# Patient Record
Sex: FEMALE | Race: WHITE | NOT HISPANIC OR LATINO
[De-identification: names, ages, dates, MRNs, and addresses within clinical notes are randomized per-mention and may not be internally consistent; named-entity substitution may affect disease eponyms.]

---

## 2024-04-01 PROBLEM — Z00.00 ENCOUNTER FOR PREVENTIVE HEALTH EXAMINATION: Status: ACTIVE | Noted: 2024-04-01

## 2024-04-08 ENCOUNTER — NON-APPOINTMENT (OUTPATIENT)
Age: 74
End: 2024-04-08

## 2024-04-12 ENCOUNTER — APPOINTMENT (OUTPATIENT)
Dept: BREAST CENTER | Facility: CLINIC | Age: 74
End: 2024-04-12
Payer: MEDICARE

## 2024-04-12 VITALS
HEART RATE: 100 BPM | DIASTOLIC BLOOD PRESSURE: 91 MMHG | HEIGHT: 67 IN | SYSTOLIC BLOOD PRESSURE: 160 MMHG | WEIGHT: 280 LBS | BODY MASS INDEX: 43.95 KG/M2

## 2024-04-12 DIAGNOSIS — Z80.3 FAMILY HISTORY OF MALIGNANT NEOPLASM OF BREAST: ICD-10-CM

## 2024-04-12 DIAGNOSIS — Z90.11 ACQUIRED ABSENCE OF RIGHT BREAST AND NIPPLE: ICD-10-CM

## 2024-04-12 DIAGNOSIS — M47.9 SPONDYLOSIS, UNSPECIFIED: ICD-10-CM

## 2024-04-12 DIAGNOSIS — Z98.890 OTHER SPECIFIED POSTPROCEDURAL STATES: ICD-10-CM

## 2024-04-12 PROCEDURE — 99204 OFFICE O/P NEW MOD 45 MIN: CPT

## 2024-04-12 RX ORDER — OMEPRAZOLE 20 MG/1
20 CAPSULE, DELAYED RELEASE ORAL DAILY
Refills: 0 | Status: ACTIVE | COMMUNITY
Start: 2024-04-12

## 2024-04-12 RX ORDER — PRAVASTATIN SODIUM 20 MG/1
20 TABLET ORAL DAILY
Refills: 0 | Status: ACTIVE | COMMUNITY
Start: 2024-04-12

## 2024-04-12 RX ORDER — METOPROLOL TARTRATE 100 MG/1
100 TABLET, FILM COATED ORAL DAILY
Refills: 0 | Status: ACTIVE | COMMUNITY
Start: 2024-04-12

## 2024-04-12 NOTE — HISTORY OF PRESENT ILLNESS
[FreeTextEntry1] : 74-year-old postmenopausal woman with a family history of breast cancer and a personal history of a right breast cancer presents after screening mammogram showed left breast asymmetry that on diagnostic views and ultrasound was found to be a 5 mm irregular centrally located mass measuring 5 mm, BI-RADS 4.  Left ultrasound-guided core biopsy revealed a moderately differentiated invasive ductal carcinoma that was ER/NE positive HER2 negative with a Ki-67 of 20%.  Patient denies any breast masses or bone pain.  In 1996 patient had a right breast mastectomy with TRAM flap reconstruction for a breast cancer followed by 3 years of tamoxifen without any chemotherapy or radiation.  Patient had a gene test then and again in 2005 both of which were negative.  Patient's mother had breast cancer at age 55 and a maternal uncle had prostate cancer.

## 2024-04-12 NOTE — PHYSICAL EXAM
[No Supraclavicular Adenopathy] : no supraclavicular adenopathy [No Cervical Adenopathy] : no cervical adenopathy [Clear to Auscultation Bilat] : clear to auscultation bilaterally [Asymmetrical] : asymmetrical [No dominant masses] : no dominant masses in right breast  [No dominant masses] : no dominant masses left breast [No Nipple Retraction] : no left nipple retraction [No Nipple Discharge] : no left nipple discharge [No Axillary Lymphadenopathy] : no left axillary lymphadenopathy [No Rashes] : no rashes [de-identified] : Status post right nonnipple sparing mastectomy with TRAM flap reconstruction.

## 2024-04-15 ENCOUNTER — RESULT REVIEW (OUTPATIENT)
Age: 74
End: 2024-04-15

## 2024-04-18 DIAGNOSIS — R59.0 LOCALIZED ENLARGED LYMPH NODES: ICD-10-CM

## 2024-04-21 ENCOUNTER — RESULT REVIEW (OUTPATIENT)
Age: 74
End: 2024-04-21

## 2024-04-23 ENCOUNTER — RESULT REVIEW (OUTPATIENT)
Age: 74
End: 2024-04-23

## 2024-04-29 ENCOUNTER — NON-APPOINTMENT (OUTPATIENT)
Age: 74
End: 2024-04-29

## 2024-05-07 ENCOUNTER — RESULT REVIEW (OUTPATIENT)
Age: 74
End: 2024-05-07

## 2024-05-10 ENCOUNTER — RESULT REVIEW (OUTPATIENT)
Age: 74
End: 2024-05-10

## 2024-05-10 ENCOUNTER — APPOINTMENT (OUTPATIENT)
Dept: BREAST CENTER | Facility: HOSPITAL | Age: 74
End: 2024-05-10

## 2024-05-15 ENCOUNTER — NON-APPOINTMENT (OUTPATIENT)
Age: 74
End: 2024-05-15

## 2024-05-21 ENCOUNTER — APPOINTMENT (OUTPATIENT)
Dept: BREAST CENTER | Facility: CLINIC | Age: 74
End: 2024-05-21
Payer: MEDICARE

## 2024-05-21 VITALS
DIASTOLIC BLOOD PRESSURE: 83 MMHG | HEIGHT: 67 IN | WEIGHT: 285 LBS | OXYGEN SATURATION: 93 % | BODY MASS INDEX: 44.73 KG/M2 | SYSTOLIC BLOOD PRESSURE: 142 MMHG | HEART RATE: 80 BPM

## 2024-05-21 DIAGNOSIS — Z85.3 PERSONAL HISTORY OF MALIGNANT NEOPLASM OF BREAST: ICD-10-CM

## 2024-05-21 PROCEDURE — 99024 POSTOP FOLLOW-UP VISIT: CPT

## 2024-05-21 NOTE — HISTORY OF PRESENT ILLNESS
[FreeTextEntry1] : 5/24 left partial mastectomy for a moderately differentiated invasive ductal carcinoma, 6 mm, negative margins, ER/NE positive HER2 negative with a Ki-67 of 15%.  Patient tolerated the surgery well, pain under control.  74-year-old postmenopausal woman with a family history of breast cancer and a personal history of a right breast cancer presents after screening mammogram showed left breast asymmetry that on diagnostic views and ultrasound was found to be a 5 mm irregular centrally located mass measuring 5 mm, BI-RADS 4.  Left ultrasound-guided core biopsy revealed a moderately differentiated invasive ductal carcinoma that was ER/NE positive HER2 negative with a Ki-67 of 20%.  Patient denies any breast masses or bone pain.  In 1996 patient had a right breast mastectomy with TRAM flap reconstruction for a breast cancer followed by 3 years of tamoxifen without any chemotherapy or radiation.  Patient had a gene test then and again in 2005 both of which were negative.  Patient's mother had breast cancer at age 55 and a maternal uncle had prostate cancer.

## 2024-06-13 ENCOUNTER — RESULT REVIEW (OUTPATIENT)
Age: 74
End: 2024-06-13

## 2024-06-13 ENCOUNTER — APPOINTMENT (OUTPATIENT)
Dept: HEMATOLOGY ONCOLOGY | Facility: CLINIC | Age: 74
End: 2024-06-13
Payer: MEDICARE

## 2024-06-13 VITALS
WEIGHT: 293 LBS | TEMPERATURE: 99.4 F | HEART RATE: 83 BPM | DIASTOLIC BLOOD PRESSURE: 87 MMHG | RESPIRATION RATE: 16 BRPM | BODY MASS INDEX: 45.99 KG/M2 | SYSTOLIC BLOOD PRESSURE: 140 MMHG | HEIGHT: 67 IN | OXYGEN SATURATION: 95 %

## 2024-06-13 DIAGNOSIS — Z78.9 OTHER SPECIFIED HEALTH STATUS: ICD-10-CM

## 2024-06-13 DIAGNOSIS — Z90.722 ACQUIRED ABSENCE OF BOTH CERVIX AND UTERUS: ICD-10-CM

## 2024-06-13 DIAGNOSIS — Z90.710 ACQUIRED ABSENCE OF BOTH CERVIX AND UTERUS: ICD-10-CM

## 2024-06-13 DIAGNOSIS — Z90.79 ACQUIRED ABSENCE OF BOTH CERVIX AND UTERUS: ICD-10-CM

## 2024-06-13 PROCEDURE — 99205 OFFICE O/P NEW HI 60 MIN: CPT

## 2024-06-13 RX ORDER — UBIDECARENONE/VIT E ACET 100MG-5
CAPSULE ORAL
Refills: 0 | Status: ACTIVE | COMMUNITY

## 2024-06-13 RX ORDER — ASCORBIC ACID 1000 MG
10 TABLET ORAL
Refills: 0 | Status: ACTIVE | COMMUNITY

## 2024-06-13 RX ORDER — AMLODIPINE BESYLATE 5 MG/1
TABLET ORAL
Refills: 0 | Status: ACTIVE | COMMUNITY

## 2024-06-13 NOTE — HISTORY OF PRESENT ILLNESS
[de-identified] : Ms. Hansen is a 74-year-old postmenopausal woman with a family history of breast cancer and a personal history of a right breast cancer presents for recurrence.   Oncological History:   In 1996 patient had a right breast mastectomy with TRAM flap reconstruction for a breast cancer followed by 3 years of tamoxifen without any chemotherapy or radiation.  s/p screening mammogram showed left breast asymmetry that on diagnostic views and ultrasound was found to be a 5 mm irregular centrally located mass measuring 5 mm, BI-RADS 4.   Left ultrasound-guided core biopsy revealed a moderately differentiated invasive ductal carcinoma that was ER/OH positive HER2 negative with a Ki-67 of 20%.   Patient had a gene test then and again in 2005 both of which were negative.   5/24 left partial mastectomy for a moderately differentiated invasive ductal carcinoma, 6 mm, negative margins, ER/OH positive HER2 negative with a Ki-67 of 15%.  DEXA many years ago. On ca++ vit D. Was told she had normal bone density.   Breast Cancer Risk Factors: Menarche: 14 Date of LMP: late 40s Menopause: late 40s G 3P2 Age at first live birth: 35 Nursed: yes Hysterectomy: yes Oophorectomy: yes OCP: 10-12 y HRT: no Last pap/pelvic exam: Dr. Morse  Related family history Patient's mother had breast cancer at age 55 and a maternal uncle had prostate cancer. Ashkenazi: yes  BRCA testing: negative s/p genetic counselor 2017

## 2024-06-13 NOTE — PHYSICAL EXAM
[Fully active, able to carry on all pre-disease performance without restriction] : Status 0 - Fully active, able to carry on all pre-disease performance without restriction [Normal] : affect appropriate [de-identified] : R mastectomy with

## 2024-06-13 NOTE — CONSULT LETTER
[Dear  ___] : Dear  [unfilled], [Consult Letter:] : I had the pleasure of evaluating your patient, [unfilled]. [Please see my note below.] : Please see my note below. [Consult Closing:] : Thank you very much for allowing me to participate in the care of this patient.  If you have any questions, please do not hesitate to contact me. [Sincerely,] : Sincerely, [FreeTextEntry3] : Cherise Reeder DO, FACIKE, FACP Medical Oncology and Hematology [facility]

## 2024-06-13 NOTE — ASSESSMENT
[FreeTextEntry1] : # L moderately differentiated invasive ductal carcinoma that was ER/SD positive HER2 negative with a Ki-67 of 20% s/p lumpectomy Reviewed the diagnosis, prognosis and natural history of IDC, ER+/SD+HER2- Reviewed the imaging and path Reviewed the NCCN guidelines and patient expresses understanding We have discussed that the patients tumor is >0.5 cm, node negative and hormone receptor positive thus she qualifies for Oncotype DX, a genomic test that will provide a recurrence score (RS). We discussed the role of oncotype in determining whether there would be a benefit of adding chemotherapy in addition to hormonal therapy based on her risk of recurrence. Since she is  >49 yo would only recommend the addition of chemotherapy if her recurrence score is > 25. She expressed understanding and she would like to proceed. We did discuss despite her recurrence score I would recommend an aromatase inhibitor, Anastrozole, 1mg PO q day for a minimum of 5 years given that her tumor is ER/SD+ and she is post menopausal. We have reviewed the side effects of Anastrozole to include but are not limited to hot flashes, mood swings, arthralgias, bone pain, thinning of the bones including osteopenia/osteoporosis. She will take this with Ca++ 1200 mg PO q day and Vit D 800 IU daily to protect her bone health. Will also check a baseline DEXA scan.  RTC in 2 weeks to review oncotype and dexa.

## 2024-06-14 LAB
25(OH)D3 SERPL-MCNC: 33.1 NG/ML
ALBUMIN SERPL ELPH-MCNC: 4.5 G/DL
ALP BLD-CCNC: 105 U/L
ALT SERPL-CCNC: 20 U/L
ANION GAP SERPL CALC-SCNC: 22 MMOL/L
AST SERPL-CCNC: 18 U/L
BILIRUB SERPL-MCNC: 0.6 MG/DL
BUN SERPL-MCNC: 17 MG/DL
CALCIUM SERPL-MCNC: 9.8 MG/DL
CHLORIDE SERPL-SCNC: 100 MMOL/L
CO2 SERPL-SCNC: 22 MMOL/L
CREAT SERPL-MCNC: 1.07 MG/DL
CRP SERPL-MCNC: 7 MG/L
EGFR: 55 ML/MIN/1.73M2
GLUCOSE SERPL-MCNC: 64 MG/DL
LDH SERPL-CCNC: 248 U/L
MAGNESIUM SERPL-MCNC: 1.5 MG/DL
PHOSPHATE SERPL-MCNC: 2.7 MG/DL
POTASSIUM SERPL-SCNC: 4 MMOL/L
PROT SERPL-MCNC: 7 G/DL
SODIUM SERPL-SCNC: 145 MMOL/L

## 2024-06-17 ENCOUNTER — RESULT REVIEW (OUTPATIENT)
Age: 74
End: 2024-06-17

## 2024-06-27 ENCOUNTER — APPOINTMENT (OUTPATIENT)
Dept: HEMATOLOGY ONCOLOGY | Facility: CLINIC | Age: 74
End: 2024-06-27
Payer: MEDICARE

## 2024-06-27 VITALS
DIASTOLIC BLOOD PRESSURE: 82 MMHG | WEIGHT: 293 LBS | BODY MASS INDEX: 45.99 KG/M2 | HEART RATE: 84 BPM | OXYGEN SATURATION: 96 % | TEMPERATURE: 99.3 F | SYSTOLIC BLOOD PRESSURE: 135 MMHG | RESPIRATION RATE: 16 BRPM | HEIGHT: 67 IN

## 2024-06-27 DIAGNOSIS — C50.812 MALIGNANT NEOPLASM OF OVERLAPPING SITES OF LEFT FEMALE BREAST: ICD-10-CM

## 2024-06-27 DIAGNOSIS — R11.0 NAUSEA: ICD-10-CM

## 2024-06-27 DIAGNOSIS — Z17.0 MALIGNANT NEOPLASM OF OVERLAPPING SITES OF LEFT FEMALE BREAST: ICD-10-CM

## 2024-06-27 PROCEDURE — 99215 OFFICE O/P EST HI 40 MIN: CPT

## 2024-06-27 NOTE — REASON FOR VISIT
[Initial Consultation] : an initial consultation [Follow-Up Visit] : a follow-up [FreeTextEntry2] : breast cancer

## 2024-06-27 NOTE — CONSULT LETTER
[Dear  ___] : Dear  [unfilled], [Consult Letter:] : I had the pleasure of evaluating your patient, [unfilled]. [Please see my note below.] : Please see my note below. [Consult Closing:] : Thank you very much for allowing me to participate in the care of this patient.  If you have any questions, please do not hesitate to contact me. [Sincerely,] : Sincerely, [FreeTextEntry3] : Cherise Reeder DO, FACIKE, FACP Medical Oncology and Hematology [facility]

## 2024-06-27 NOTE — HISTORY OF PRESENT ILLNESS
[de-identified] : Ms. Hansen is a 74-year-old postmenopausal woman with a family history of breast cancer and a personal history of a right breast cancer presents for recurrence.   Oncological History:   In 1996 patient had a right breast mastectomy with TRAM flap reconstruction for a breast cancer followed by 3 years of tamoxifen without any chemotherapy or radiation.  s/p screening mammogram showed left breast asymmetry that on diagnostic views and ultrasound was found to be a 5 mm irregular centrally located mass measuring 5 mm, BI-RADS 4.   Left ultrasound-guided core biopsy revealed a moderately differentiated invasive ductal carcinoma that was ER/LA positive HER2 negative with a Ki-67 of 20%.   Patient had a gene test then and again in 2005 both of which were negative.   5/24 left partial mastectomy for a moderately differentiated invasive ductal carcinoma, 6 mm, negative margins, ER/LA positive HER2 negative with a Ki-67 of 15%.  DEXA many years ago. On ca++ vit D. Was told she had normal bone density.   Breast Cancer Risk Factors: Menarche: 14 Date of LMP: late 40s Menopause: late 40s G 3P2 Age at first live birth: 35 Nursed: yes Hysterectomy: yes Oophorectomy: yes OCP: 10-12 y HRT: no Last pap/pelvic exam: Dr. Morse  Related family history Patient's mother had breast cancer at age 55 and a maternal uncle had prostate cancer. Ashkenazi: yes  BRCA testing: negative s/p genetic counselor 2017 [de-identified] : Patient is here for follow up and to review oncotype and bone density Feeling well  DEXA 6/18/2024 [facility]- [facility] GYN-2/2024 Mammo-5/2024

## 2024-06-27 NOTE — PHYSICAL EXAM
[Fully active, able to carry on all pre-disease performance without restriction] : Status 0 - Fully active, able to carry on all pre-disease performance without restriction [Normal] : affect appropriate [de-identified] : R mastectomy with

## 2024-06-27 NOTE — ASSESSMENT
[With Patient/Caregiver] : With Patient/Caregiver [Designated Health Care Proxy] : Designated Health Care Proxy [Name: ___] : Name: [unfilled] [Relationship: ___] : Relationship: [unfilled] [FreeTextEntry1] : # L moderately differentiated invasive ductal carcinoma that was ER/IA positive HER2 negative with a Ki-67 of 20% s/p lumpectomy oncotype 26 - >15% benefit for the addition of chemo I would recommend chemotherapy with Taxotere 75 mg/m2 and Cytoxan 600 mg/m2 followed by onpro x 4 cycles every 3 weeks.  We have reviewed the side effects of this regimen to include but are not limited to cytopenias with increased risk of infection, sepsis and death, N/V, fatigue, swelling, hair loss, <1 % chance of developing leukemia in the future.  Will give steroids day before, day of and day after treatment to aid with swelling Will get port for access. Lidocaine - prilocaine cream ordered Zofran orered PRN Nausea for home After chemo and RT I would recommend an aromatase inhibitor, Anastrozole, 1mg PO q day for a minimum of 5 years given that her tumor is ER/IA+ and she is post menopausal. We have reviewed the side effects of Anastrozole to include but are not limited to hot flashes, mood swings, arthralgias, bone pain, thinning of the bones including osteopenia/osteoporosis. She will take this with Ca++ 1200 mg PO q day and Vit D 800 IU daily to protect her bone health.  DEXA shows normal bone density Port ordered Emla cream ordered zofran ordered dexamethasone ordered. She does not wish to consider cold capping at this time.  RTC in 2 weeks with cbc with diff, cmp, ESR/CRP, LDH, Mag, phos, Vit D [AdvancecareDate] : 06/27/2024 [FreeTextEntry2] : Surrogate Principal Discharge DX:	Other closed displaced fracture of proximal end of right humerus, initial encounter

## 2024-07-09 ENCOUNTER — RESULT REVIEW (OUTPATIENT)
Age: 74
End: 2024-07-09

## 2024-07-10 ENCOUNTER — RESULT REVIEW (OUTPATIENT)
Age: 74
End: 2024-07-10

## 2024-07-11 ENCOUNTER — APPOINTMENT (OUTPATIENT)
Dept: HEMATOLOGY ONCOLOGY | Facility: CLINIC | Age: 74
End: 2024-07-11
Payer: MEDICARE

## 2024-07-11 ENCOUNTER — RESULT REVIEW (OUTPATIENT)
Age: 74
End: 2024-07-11

## 2024-07-11 ENCOUNTER — LABORATORY RESULT (OUTPATIENT)
Age: 74
End: 2024-07-11

## 2024-07-11 VITALS
HEART RATE: 89 BPM | RESPIRATION RATE: 16 BRPM | TEMPERATURE: 99.6 F | WEIGHT: 293 LBS | DIASTOLIC BLOOD PRESSURE: 86 MMHG | OXYGEN SATURATION: 97 % | HEIGHT: 67 IN | SYSTOLIC BLOOD PRESSURE: 138 MMHG | BODY MASS INDEX: 45.99 KG/M2

## 2024-07-11 DIAGNOSIS — C50.812 MALIGNANT NEOPLASM OF OVERLAPPING SITES OF LEFT FEMALE BREAST: ICD-10-CM

## 2024-07-11 DIAGNOSIS — Z17.0 MALIGNANT NEOPLASM OF OVERLAPPING SITES OF LEFT FEMALE BREAST: ICD-10-CM

## 2024-07-11 PROCEDURE — 99214 OFFICE O/P EST MOD 30 MIN: CPT

## 2024-07-29 NOTE — CONSULT LETTER
[Dear  ___] : Dear  [unfilled], [Consult Letter:] : I had the pleasure of evaluating your patient, [unfilled]. [Please see my note below.] : Please see my note below. [Consult Closing:] : Thank you very much for allowing me to participate in the care of this patient.  If you have any questions, please do not hesitate to contact me. [Sincerely,] : Sincerely, [FreeTextEntry3] : Cherise Reeder DO, FACIKE, FACP Medical Oncology and Hematology [facility]

## 2024-07-29 NOTE — CONSULT LETTER
[Dear  ___] : Dear  [unfilled], [Consult Letter:] : I had the pleasure of evaluating your patient, [unfilled]. [Please see my note below.] : Please see my note below. [Consult Closing:] : Thank you very much for allowing me to participate in the care of this patient.  If you have any questions, please do not hesitate to contact me. [FreeTextEntry3] : Cherise Reeder DO, FACIKE, FACP Medical Oncology and Hematology [facility] [Sincerely,] : Sincerely,

## 2024-08-01 ENCOUNTER — APPOINTMENT (OUTPATIENT)
Dept: HEMATOLOGY ONCOLOGY | Facility: CLINIC | Age: 74
End: 2024-08-01
Payer: MEDICARE

## 2024-08-01 ENCOUNTER — LABORATORY RESULT (OUTPATIENT)
Age: 74
End: 2024-08-01

## 2024-08-01 ENCOUNTER — RESULT REVIEW (OUTPATIENT)
Age: 74
End: 2024-08-01

## 2024-08-01 VITALS
WEIGHT: 293 LBS | TEMPERATURE: 98.2 F | BODY MASS INDEX: 45.99 KG/M2 | DIASTOLIC BLOOD PRESSURE: 81 MMHG | HEIGHT: 67 IN | OXYGEN SATURATION: 96 % | SYSTOLIC BLOOD PRESSURE: 150 MMHG | RESPIRATION RATE: 16 BRPM | HEART RATE: 101 BPM

## 2024-08-01 DIAGNOSIS — C50.812 MALIGNANT NEOPLASM OF OVERLAPPING SITES OF LEFT FEMALE BREAST: ICD-10-CM

## 2024-08-01 DIAGNOSIS — E83.39 OTHER DISORDERS OF PHOSPHORUS METABOLISM: ICD-10-CM

## 2024-08-01 DIAGNOSIS — Z17.0 MALIGNANT NEOPLASM OF OVERLAPPING SITES OF LEFT FEMALE BREAST: ICD-10-CM

## 2024-08-01 PROCEDURE — 99214 OFFICE O/P EST MOD 30 MIN: CPT

## 2024-08-01 RX ORDER — DIBASIC SODIUM PHOSPHATE, MONOBASIC POTASSIUM PHOSPHATE AND MONOBASIC SODIUM PHOSPHATE 852; 155; 130 MG/1; MG/1; MG/1
155-852-130 TABLET ORAL
Qty: 7 | Refills: 0 | Status: ACTIVE | COMMUNITY
Start: 2024-08-01 | End: 1900-01-01

## 2024-08-01 NOTE — REVIEW OF SYSTEMS
[Diarrhea: Grade 0] : Diarrhea: Grade 0 [Negative] : Allergic/Immunologic [Fever] : no fever [Chills] : no chills [Night Sweats] : no night sweats [Fatigue] : fatigue [Recent Change In Weight] : ~T no recent weight change [Abdominal Pain] : no abdominal pain [Vomiting] : no vomiting [Constipation] : no constipation [Diarrhea: Grade 1 - Increase of <4 stools per day over baseline; mild increase in ostomy output compared to baseline] : Diarrhea: Grade 1 - Increase of <4 stools per day over baseline; mild increase in ostomy output compared to baseline [Joint Pain] : no joint pain [Joint Stiffness] : no joint stiffness [Muscle Pain] : no muscle pain [Muscle Weakness] : muscle weakness [FreeTextEntry2] : appetite okay  [FreeTextEntry7] : +nausea

## 2024-08-01 NOTE — ASSESSMENT
[With Patient/Caregiver] : With Patient/Caregiver [Designated Health Care Proxy] : Designated Health Care Proxy [Name: ___] : Name: [unfilled] [Relationship: ___] : Relationship: [unfilled] [FreeTextEntry1] : #Breast Cancer  - L moderately differentiated invasive ductal carcinoma that was ER/CT positive HER2 negative with a Ki-67 of 20% - s/p lumpectomy - Oncotype 26 - >15% benefit for the addition of chemo - I would recommend chemotherapy with Taxotere 75 mg/m2 and Cytoxan 600 mg/m2 followed by onpro x 4 cycles every 3 weeks.  - We have reviewed the side effects of this regimen to include but are not limited to cytopenias with increased risk of infection, sepsis and death, N/V, fatigue, swelling, hair loss, <1 % chance of developing leukemia in the future.  - Will give steroids day before, day of and day after treatment to aid with swelling - Will get port for access. Lidocaine - prilocaine cream ordered - Zofran orered PRN Nausea for home - After chemo and RT I would recommend an aromatase inhibitor, Anastrozole, 1mg PO q day for a minimum of 5 years given that her tumor is ER/CT+ and she is post menopausal. We have reviewed the side effects of Anastrozole to include but are not limited to hot flashes, mood swings, arthralgias, bone pain, thinning of the bones including osteopenia/osteoporosis. She will take this with Ca++ 1200 mg PO q day and Vit D 800 IU daily to protect her bone health.  - DEXA shows normal bone density - Port ordered - Emla cream ordered - Zofran ordered - Dexamethasone ordered. - She does not wish to consider cold capping at this time. - 7/11/24 - vs and labs reviewed. labs drawn in office today. wbc 15 (on steroids - monitor), hgb 15.2, plt 367. cr wnl, liver enzymes wnl., electrolytes wnl. issue with port and tubbing can not be used. proceed with first cycle of TC peripherally. - 8/1/24 vs and labs reviewed; WBC 20.54, hgb 14, [id number] (on steroids and s/p Neulasta on pro) no fevers. ANC wnl. CMP wnl. Proceed with C2 TC today.   #Nausea  - No emesis relived with Zofran. Continue Zofran prn   #Diarrhea  - 3 days following last cycle relieved with Immodium. Continue prn   #Hypomagnesemia  - 1.47 patient does not want IV will supplement orally   #Hypophosphatemia  - 1.9 - patient does not want 4h IV. Will send Phospha. Reviewed s/s of hypomag and hypophos  #Port  - Patient had port placed however with C1 there was difficulty with accessing. It was evaluated by Dr. Duque, cath was noted to no longer be attached. Thus C1 was giving peripherally. Patient does not want to go back to IR thus will continue to give peripherally. Assessed for arm swelling or neck swelling related to port, none on PE today although difficult due to body habitus. Continue to monitor. No clot s/s.   RTC in 3 weeks with cbc with diff, cmp, ESR/CRP, LDH, Mag, phos, Vit D  Case and management discussed with Dr. 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

## 2024-08-01 NOTE — ASSESSMENT
[With Patient/Caregiver] : With Patient/Caregiver [Designated Health Care Proxy] : Designated Health Care Proxy [Name: ___] : Name: [unfilled] [Relationship: ___] : Relationship: [unfilled] [FreeTextEntry1] : #Breast Cancer  - L moderately differentiated invasive ductal carcinoma that was ER/OK positive HER2 negative with a Ki-67 of 20% - s/p lumpectomy - Oncotype 26 - >15% benefit for the addition of chemo - I would recommend chemotherapy with Taxotere 75 mg/m2 and Cytoxan 600 mg/m2 followed by onpro x 4 cycles every 3 weeks.  - We have reviewed the side effects of this regimen to include but are not limited to cytopenias with increased risk of infection, sepsis and death, N/V, fatigue, swelling, hair loss, <1 % chance of developing leukemia in the future.  - Will give steroids day before, day of and day after treatment to aid with swelling - Will get port for access. Lidocaine - prilocaine cream ordered - Zofran orered PRN Nausea for home - After chemo and RT I would recommend an aromatase inhibitor, Anastrozole, 1mg PO q day for a minimum of 5 years given that her tumor is ER/OK+ and she is post menopausal. We have reviewed the side effects of Anastrozole to include but are not limited to hot flashes, mood swings, arthralgias, bone pain, thinning of the bones including osteopenia/osteoporosis. She will take this with Ca++ 1200 mg PO q day and Vit D 800 IU daily to protect her bone health.  - DEXA shows normal bone density - Port ordered - Emla cream ordered - Zofran ordered - Dexamethasone ordered. - She does not wish to consider cold capping at this time. - 7/11/24 - vs and labs reviewed. labs drawn in office today. wbc 15 (on steroids - monitor), hgb 15.2, plt 367. cr wnl, liver enzymes wnl., electrolytes wnl. issue with port and tubbing can not be used. proceed with first cycle of TC peripherally. - 8/1/24 vs and labs reviewed; WBC 20.54, hgb 14, [id number] (on steroids and s/p Neulasta on pro) no fevers. ANC wnl. CMP wnl. Proceed with C2 TC today.   #Nausea  - No emesis relived with Zofran. Continue Zofran prn   #Diarrhea  - 3 days following last cycle relieved with Immodium. Continue prn   #Hypomagnesemia  - 1.47 patient does not want IV will supplement orally   #Hypophosphatemia  - 1.9 - patient does not want 4h IV. Will send Phospha. Reviewed s/s of hypomag and hypophos  #Port  - Patient had port placed however with C1 there was difficulty with accessing. It was evaluated by Dr. Duque, cath was noted to no longer be attached. Thus C1 was giving peripherally. Patient does not want to go back to IR thus will continue to give peripherally. Assessed for arm swelling or neck swelling related to port, none on PE today although difficult due to body habitus. Continue to monitor. No clot s/s.   RTC in 3 weeks with cbc with diff, cmp, ESR/CRP, LDH, Mag, phos, Vit D  Case and management discussed with Dr. 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

## 2024-08-01 NOTE — PHYSICAL EXAM
[Fully active, able to carry on all pre-disease performance without restriction] : Status 0 - Fully active, able to carry on all pre-disease performance without restriction [Normal] : affect appropriate [de-identified] : deferred today

## 2024-08-01 NOTE — HISTORY OF PRESENT ILLNESS
[de-identified] : Ms. Hansen is a 74-year-old postmenopausal woman with a family history of breast cancer and a personal history of a right breast cancer presents for recurrence.   Oncological History:   In 1996 patient had a right breast mastectomy with TRAM flap reconstruction for a breast cancer followed by 3 years of tamoxifen without any chemotherapy or radiation.  s/p screening mammogram showed left breast asymmetry that on diagnostic views and ultrasound was found to be a 5 mm irregular centrally located mass measuring 5 mm, BI-RADS 4.   Left ultrasound-guided core biopsy revealed a moderately differentiated invasive ductal carcinoma that was ER/NC positive HER2 negative with a Ki-67 of 20%.   Patient had a gene test then and again in 2005 both of which were negative.   5/24 left partial mastectomy for a moderately differentiated invasive ductal carcinoma, 6 mm, negative margins, ER/NC positive HER2 negative with a Ki-67 of 15%.  DEXA many years ago. On ca++ vit D. Was told she had normal bone density.   Breast Cancer Risk Factors: Menarche: 14 Date of LMP: late 40s Menopause: late 40s G 3P2 Age at first live birth: 35 Nursed: yes Hysterectomy: yes Oophorectomy: yes OCP: 10-12 y HRT: no Last pap/pelvic exam: Dr. Morse  Related family history Patient's mother had breast cancer at age 55 and a maternal uncle had prostate cancer. Ashkenazi: yes  BRCA testing: negative s/p genetic counselor 2017 [de-identified] : Patient seen and examined for follow up. s/p C1 TC. She had complications with port and is now receiving peripherally. Has not gone back to IR to get port removed. She tolerated last cycle well although did have some flushing with Taxotere and increase in Benadryl and Pepcid and slower rate she was able to complete. She took steroids last night and this morning. She notes about 3 days of diarrhea took Pepto initially and then Imodium with relief. She had nausea without emesis relived with Zofran. Fatigued but states she felt better than she thought it would be. +hairloss. Denies neuropathy   DEXA 6/18/2024 [facility]- [facility] GYN-2/2024 Mammo-5/2024

## 2024-08-01 NOTE — PHYSICAL EXAM
[Fully active, able to carry on all pre-disease performance without restriction] : Status 0 - Fully active, able to carry on all pre-disease performance without restriction [Normal] : affect appropriate [de-identified] : deferred today

## 2024-08-01 NOTE — HISTORY OF PRESENT ILLNESS
[de-identified] : Ms. Hansen is a 74-year-old postmenopausal woman with a family history of breast cancer and a personal history of a right breast cancer presents for recurrence.   Oncological History:   In 1996 patient had a right breast mastectomy with TRAM flap reconstruction for a breast cancer followed by 3 years of tamoxifen without any chemotherapy or radiation.  s/p screening mammogram showed left breast asymmetry that on diagnostic views and ultrasound was found to be a 5 mm irregular centrally located mass measuring 5 mm, BI-RADS 4.   Left ultrasound-guided core biopsy revealed a moderately differentiated invasive ductal carcinoma that was ER/VT positive HER2 negative with a Ki-67 of 20%.   Patient had a gene test then and again in 2005 both of which were negative.   5/24 left partial mastectomy for a moderately differentiated invasive ductal carcinoma, 6 mm, negative margins, ER/VT positive HER2 negative with a Ki-67 of 15%.  DEXA many years ago. On ca++ vit D. Was told she had normal bone density.   Breast Cancer Risk Factors: Menarche: 14 Date of LMP: late 40s Menopause: late 40s G 3P2 Age at first live birth: 35 Nursed: yes Hysterectomy: yes Oophorectomy: yes OCP: 10-12 y HRT: no Last pap/pelvic exam: Dr. Morse  Related family history Patient's mother had breast cancer at age 55 and a maternal uncle had prostate cancer. Ashkenazi: yes  BRCA testing: negative s/p genetic counselor 2017 [de-identified] : Patient seen and examined for follow up. s/p C1 TC. She had complications with port and is now receiving peripherally. Has not gone back to IR to get port removed. She tolerated last cycle well although did have some flushing with Taxotere and increase in Benadryl and Pepcid and slower rate she was able to complete. She took steroids last night and this morning. She notes about 3 days of diarrhea took Pepto initially and then Imodium with relief. She had nausea without emesis relived with Zofran. Fatigued but states she felt better than she thought it would be. +hairloss. Denies neuropathy   DEXA 6/18/2024 [facility]- 2019 GYN-2/2024 Mammo-5/2024

## 2024-08-22 ENCOUNTER — RESULT REVIEW (OUTPATIENT)
Age: 74
End: 2024-08-22

## 2024-08-22 ENCOUNTER — APPOINTMENT (OUTPATIENT)
Dept: HEMATOLOGY ONCOLOGY | Facility: CLINIC | Age: 74
End: 2024-08-22
Payer: MEDICARE

## 2024-08-22 VITALS
BODY MASS INDEX: 45.99 KG/M2 | RESPIRATION RATE: 16 BRPM | HEART RATE: 100 BPM | OXYGEN SATURATION: 98 % | TEMPERATURE: 98 F | WEIGHT: 293 LBS | HEIGHT: 67 IN | DIASTOLIC BLOOD PRESSURE: 85 MMHG | SYSTOLIC BLOOD PRESSURE: 142 MMHG

## 2024-08-22 DIAGNOSIS — C50.812 MALIGNANT NEOPLASM OF OVERLAPPING SITES OF LEFT FEMALE BREAST: ICD-10-CM

## 2024-08-22 DIAGNOSIS — Z17.0 MALIGNANT NEOPLASM OF OVERLAPPING SITES OF LEFT FEMALE BREAST: ICD-10-CM

## 2024-08-22 DIAGNOSIS — Z85.3 PERSONAL HISTORY OF MALIGNANT NEOPLASM OF BREAST: ICD-10-CM

## 2024-08-22 PROCEDURE — 99214 OFFICE O/P EST MOD 30 MIN: CPT

## 2024-08-22 RX ORDER — SODIUM PHOSPHATE, DIBASIC, ANHYDROUS, POTASSIUM PHOSPHATE, MONOBASIC, AND SODIUM PHOSPHATE, MONOBASIC, MONOHYDRATE 852; 155; 130 MG/1; MG/1; MG/1
155-852-130 TABLET, COATED ORAL
Qty: 7 | Refills: 0 | Status: ACTIVE | COMMUNITY
Start: 2024-08-22 | End: 1900-01-01

## 2024-08-22 RX ORDER — MAGNESIUM OXIDE/MAG AA CHELATE 300 MG
CAPSULE ORAL
Refills: 0 | Status: ACTIVE | COMMUNITY

## 2024-08-23 NOTE — PHYSICAL EXAM
[Fully active, able to carry on all pre-disease performance without restriction] : Status 0 - Fully active, able to carry on all pre-disease performance without restriction [Normal] : affect appropriate [de-identified] : deferred today

## 2024-08-23 NOTE — ASSESSMENT
[With Patient/Caregiver] : With Patient/Caregiver [Designated Health Care Proxy] : Designated Health Care Proxy [Name: ___] : Name: [unfilled] [Relationship: ___] : Relationship: [unfilled] [FreeTextEntry1] : #Breast Cancer  - L moderately differentiated invasive ductal carcinoma that was ER/[place]I positive HER2 negative with a Ki-67 of 20% - s/p lumpectomy - Oncotype 26 - >15% benefit for the addition of chemo - I would recommend chemotherapy with Taxotere 75 mg/m2 and Cytoxan 600 mg/m2 followed by onpro x 4 cycles every 3 weeks.  - We have reviewed the side effects of this regimen to include but are not limited to cytopenias with increased risk of infection, sepsis and death, N/V, fatigue, swelling, hair loss, <1 % chance of developing leukemia in the future.  - Will give steroids day before, day of and day after treatment to aid with swelling - Will get port for access. Lidocaine - prilocaine cream ordered - Zofran orered PRN Nausea for home - After chemo and RT I would recommend an aromatase inhibitor, Anastrozole, 1mg PO q day for a minimum of 5 years given that her tumor is ER/WI+ and she is post menopausal. We have reviewed the side effects of Anastrozole to include but are not limited to hot flashes, mood swings, arthralgias, bone pain, thinning of the bones including osteopenia/osteoporosis. She will take this with Ca++ 1200 mg PO q day and Vit D 800 IU daily to protect her bone health.  - DEXA shows normal bone density - Port ordered - Emla cream ordered - Zofran ordered - Dexamethasone ordered. - She does not wish to consider cold capping at this time. - 7/11/24 - vs and labs reviewed. labs drawn in office today. wbc 15 (on steroids - monitor), hgb 15.2, plt 367. cr wnl, liver enzymes wnl., electrolytes wnl. issue with port and tubbing can not be used. proceed with first cycle of TC peripherally. - 8/1/24 vs and labs reviewed; WBC 20.54, hgb 14, [id number] (on steroids and s/p Neulasta on pro) no fevers. ANC wnl. CMP wnl. Proceed with C2 TC today.  8/22/24 - vs and labs reviewed. labs drawn in office today. wbc 16.33, hgb 12.8, plts 535 - likely due to inflammation - monitor. kidney function, liver function and electrolytes wnl. ALKP 158 - monitor check GGT at [place]. CRP 17.6. ESR 69. proceed C3 TC today  #Nausea  - No emesis relived with Zofran. Continue Zofran prn   #Hypomagnesemia  - 1.47 patient does not want IV will supplement orally   #Hypophosphatemia  - 1.5 - patient does not want 4h IV. Will send Phospha. Reviewed s/s of hypomag and hypophos  #Port  - Patient had port placed however with C1 there was difficulty with accessing. It was evaluated by Dr. Duque, cath was noted to no longer be attached. Thus C1 was giving peripherally. Patient does not want to go back to IR thus will continue to give peripherally. Assessed for arm swelling or neck swelling related to port, none on PE today although difficult due to body habitus. Continue to monitor. No clot s/s.   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

## 2024-08-23 NOTE — HISTORY OF PRESENT ILLNESS
[de-identified] : Ms. Hansen is a 74-year-old postmenopausal woman with a family history of breast cancer and a personal history of a right breast cancer presents for recurrence.   Oncological History:   In 1996 patient had a right breast mastectomy with TRAM flap reconstruction for a breast cancer followed by 3 years of tamoxifen without any chemotherapy or radiation.  s/p screening mammogram showed left breast asymmetry that on diagnostic views and ultrasound was found to be a 5 mm irregular centrally located mass measuring 5 mm, BI-RADS 4.   Left ultrasound-guided core biopsy revealed a moderately differentiated invasive ductal carcinoma that was ER/CT positive HER2 negative with a Ki-67 of 20%.   Patient had a gene test then and again in 2005 both of which were negative.   5/24 left partial mastectomy for a moderately differentiated invasive ductal carcinoma, 6 mm, negative margins, ER/CT positive HER2 negative with a Ki-67 of 15%.  DEXA many years ago. On ca++ vit D. Was told she had normal bone density.   Breast Cancer Risk Factors: Menarche: 14 Date of LMP: late 40s Menopause: late 40s G 3P2 Age at first live birth: 35 Nursed: yes Hysterectomy: yes Oophorectomy: yes OCP: 10-12 y HRT: no Last pap/pelvic exam: Dr. Morse  Related family history Patient's mother had breast cancer at age 55 and a maternal uncle had prostate cancer. Ashkenazi: yes  BRCA testing: negative s/p genetic counselor 2017 [de-identified] : Patient seen and examined for follow up. due for C3 TC. She had complications with port and is now receiving peripherally.  Appt for port removal 8/29/2024.No flushing with cycle 2 Taxotere with increased benadryl. feeling well other than a little more fatigue  DEXA 6/18/2024 [facility]-2/2024 Mammo-5/2024

## 2024-08-23 NOTE — PHYSICAL EXAM
[Fully active, able to carry on all pre-disease performance without restriction] : Status 0 - Fully active, able to carry on all pre-disease performance without restriction [Normal] : affect appropriate [de-identified] : deferred today

## 2024-08-23 NOTE — ASSESSMENT
[With Patient/Caregiver] : With Patient/Caregiver [Designated Health Care Proxy] : Designated Health Care Proxy [Name: ___] : Name: [unfilled] [Relationship: ___] : Relationship: [unfilled] [FreeTextEntry1] : #Breast Cancer  - L moderately differentiated invasive ductal carcinoma that was ER/[place] positive HER2 negative with a Ki-67 of 20% - s/p lumpectomy - Oncotype 26 - >15% benefit for the addition of chemo - I would recommend chemotherapy with Taxotere 75 mg/m2 and Cytoxan 600 mg/m2 followed by onpro x 4 cycles every 3 weeks.  - We have reviewed the side effects of this regimen to include but are not limited to cytopenias with increased risk of infection, sepsis and death, N/V, fatigue, swelling, hair loss, <1 % chance of developing leukemia in the future.  - Will give steroids day before, day of and day after treatment to aid with swelling - Will get port for access. Lidocaine - prilocaine cream ordered - Zofran orered PRN Nausea for home - After chemo and RT I would recommend an aromatase inhibitor, Anastrozole, 1mg PO q day for a minimum of 5 years given that her tumor is ER/DC+ and she is post menopausal. We have reviewed the side effects of Anastrozole to include but are not limited to hot flashes, mood swings, arthralgias, bone pain, thinning of the bones including osteopenia/osteoporosis. She will take this with Ca++ 1200 mg PO q day and Vit D 800 IU daily to protect her bone health.  - DEXA shows normal bone density - Port ordered - Emla cream ordered - Zofran ordered - Dexamethasone ordered. - She does not wish to consider cold capping at this time. - 7/11/24 - vs and labs reviewed. labs drawn in office today. wbc 15 (on steroids - monitor), hgb 15.2, plt 367. cr wnl, liver enzymes wnl., electrolytes wnl. issue with port and tubbing can not be used. proceed with first cycle of TC peripherally. - 8/1/24 vs and labs reviewed; WBC 20.54, hgb 14, [id number] (on steroids and s/p Neulasta on pro) no fevers. ANC wnl. CMP wnl. Proceed with C2 TC today.  8/22/24 - vs and labs reviewed. labs drawn in office today. wbc 16.33, hgb 12.8, plts 535 - likely due to inflammation - monitor. kidney function, liver function and electrolytes wnl. ALKP 158 - monitor check GGT at [place]. CRP 17.6. ESR 69. proceed C3 TC today  #Nausea  - No emesis relived with Zofran. Continue Zofran prn   #Hypomagnesemia  - 1.47 patient does not want IV will supplement orally   #Hypophosphatemia  - 1.5 - patient does not want 4h IV. Will send Phospha. Reviewed s/s of hypomag and hypophos  #Port  - Patient had port placed however with C1 there was difficulty with accessing. It was evaluated by Dr. Duque, cath was noted to no longer be attached. Thus C1 was giving peripherally. Patient does not want to go back to IR thus will continue to give peripherally. Assessed for arm swelling or neck swelling related to port, none on PE today although difficult due to body habitus. Continue to monitor. No clot s/s.   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

## 2024-08-23 NOTE — HISTORY OF PRESENT ILLNESS
[de-identified] : Ms. Hansen is a 74-year-old postmenopausal woman with a family history of breast cancer and a personal history of a right breast cancer presents for recurrence.   Oncological History:   In 1996 patient had a right breast mastectomy with TRAM flap reconstruction for a breast cancer followed by 3 years of tamoxifen without any chemotherapy or radiation.  s/p screening mammogram showed left breast asymmetry that on diagnostic views and ultrasound was found to be a 5 mm irregular centrally located mass measuring 5 mm, BI-RADS 4.   Left ultrasound-guided core biopsy revealed a moderately differentiated invasive ductal carcinoma that was ER/[place] positive HER2 negative with a Ki-67 of 20%.   Patient had a gene test then and again in 2005 both of which were negative.   5/24 left partial mastectomy for a moderately differentiated invasive ductal carcinoma, 6 mm, negative margins, ER/[place] positive HER2 negative with a Ki-67 of 15%.  DEXA many years ago. On ca++ vit D. Was told she had normal bone density.   Breast Cancer Risk Factors: Menarche: 14 Date of LMP: late 40s Menopause: late 40s G 3P2 Age at first live birth: 35 Nursed: yes Hysterectomy: yes Oophorectomy: yes OCP: 10-12 y HRT: no Last pap/pelvic exam: Dr. Morse  Related family history Patient's mother had breast cancer at age 55 and a maternal uncle had prostate cancer. Ashkenazi: yes  BRCA testing: negative s/p genetic counselor 2017 [de-identified] : Patient seen and examined for follow up. due for C3 TC. She had complications with port and is now receiving peripherally.  Appt for port removal 8/29/2024.No flushing with cycle 2 Taxotere with increased benadryl. feeling well other than a little more fatigue  DEXA 6/18/2024 [facility]- [facility]-2/2024 Mammo-5/2024

## 2024-08-23 NOTE — REVIEW OF SYSTEMS
[Fatigue] : fatigue [Diarrhea: Grade 1 - Increase of <4 stools per day over baseline; mild increase in ostomy output compared to baseline] : Diarrhea: Grade 1 - Increase of <4 stools per day over baseline; mild increase in ostomy output compared to baseline [Muscle Weakness] : muscle weakness [Negative] : Allergic/Immunologic [Fever] : no fever [Chills] : no chills [Night Sweats] : no night sweats [Recent Change In Weight] : ~T no recent weight change [Abdominal Pain] : no abdominal pain [Vomiting] : no vomiting [Constipation] : no constipation [Joint Pain] : no joint pain [Joint Stiffness] : no joint stiffness [Muscle Pain] : no muscle pain [FreeTextEntry2] : appetite okay  [FreeTextEntry7] : mild nausea, occasional diarrhea controlled with Imodium

## 2024-08-23 NOTE — ASSESSMENT
[With Patient/Caregiver] : With Patient/Caregiver [Designated Health Care Proxy] : Designated Health Care Proxy [Name: ___] : Name: [unfilled] [Relationship: ___] : Relationship: [unfilled] [FreeTextEntry1] : #Breast Cancer  - L moderately differentiated invasive ductal carcinoma that was ER/[place] positive HER2 negative with a Ki-67 of 20% - s/p lumpectomy - Oncotype 26 - >15% benefit for the addition of chemo - I would recommend chemotherapy with Taxotere 75 mg/m2 and Cytoxan 600 mg/m2 followed by onpro x 4 cycles every 3 weeks.  - We have reviewed the side effects of this regimen to include but are not limited to cytopenias with increased risk of infection, sepsis and death, N/V, fatigue, swelling, hair loss, <1 % chance of developing leukemia in the future.  - Will give steroids day before, day of and day after treatment to aid with swelling - Will get port for access. Lidocaine - prilocaine cream ordered - Zofran orered PRN Nausea for home - After chemo and RT I would recommend an aromatase inhibitor, Anastrozole, 1mg PO q day for a minimum of 5 years given that her tumor is ER/OH+ and she is post menopausal. We have reviewed the side effects of Anastrozole to include but are not limited to hot flashes, mood swings, arthralgias, bone pain, thinning of the bones including osteopenia/osteoporosis. She will take this with Ca++ 1200 mg PO q day and Vit D 800 IU daily to protect her bone health.  - DEXA shows normal bone density - Port ordered - Emla cream ordered - Zofran ordered - Dexamethasone ordered. - She does not wish to consider cold capping at this time. - 7/11/24 - vs and labs reviewed. labs drawn in office today. wbc 15 (on steroids - monitor), hgb 15.2, plt 367. cr wnl, liver enzymes wnl., electrolytes wnl. issue with port and tubbing can not be used. proceed with first cycle of TC peripherally. - 8/1/24 vs and labs reviewed; WBC 20.54, hgb 14, [id number] (on steroids and s/p Neulasta on pro) no fevers. ANC wnl. CMP wnl. Proceed with C2 TC today.  8/22/24 - vs and labs reviewed. labs drawn in office today. wbc 16.33, hgb 12.8, plts 535 - likely due to inflammation - monitor. kidney function, liver function and electrolytes wnl. ALKP 158 - monitor check GGT at [place]. CRP 17.6. ESR 69. proceed C3 TC today  #Nausea  - No emesis relived with Zofran. Continue Zofran prn   #Hypomagnesemia  - 1.47 patient does not want IV will supplement orally   #Hypophosphatemia  - 1.5 - patient does not want 4h IV. Will send Phospha. Reviewed s/s of hypomag and hypophos  #Port  - Patient had port placed however with C1 there was difficulty with accessing. It was evaluated by Dr. Duque, cath was noted to no longer be attached. Thus C1 was giving peripherally. Patient does not want to go back to IR thus will continue to give peripherally. Assessed for arm swelling or neck swelling related to port, none on PE today although difficult due to body habitus. Continue to monitor. No clot s/s.   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

## 2024-08-23 NOTE — HISTORY OF PRESENT ILLNESS
[de-identified] : Ms. Hansen is a 74-year-old postmenopausal woman with a family history of breast cancer and a personal history of a right breast cancer presents for recurrence.   Oncological History:   In 1996 patient had a right breast mastectomy with TRAM flap reconstruction for a breast cancer followed by 3 years of tamoxifen without any chemotherapy or radiation.  s/p screening mammogram showed left breast asymmetry that on diagnostic views and ultrasound was found to be a 5 mm irregular centrally located mass measuring 5 mm, BI-RADS 4.   Left ultrasound-guided core biopsy revealed a moderately differentiated invasive ductal carcinoma that was ER/[place] positive HER2 negative with a Ki-67 of 20%.   Patient had a gene test then and again in 2005 both of which were negative.   5/24 left partial mastectomy for a moderately differentiated invasive ductal carcinoma, 6 mm, negative margins, ER/MT positive HER2 negative with a Ki-67 of 15%.  DEXA many years ago. On ca++ vit D. Was told she had normal bone density.   Breast Cancer Risk Factors: Menarche: 14 Date of LMP: late 40s Menopause: late 40s G 3P2 Age at first live birth: 35 Nursed: yes Hysterectomy: yes Oophorectomy: yes OCP: 10-12 y HRT: no Last pap/pelvic exam: Dr. Morse  Related family history Patient's mother had breast cancer at age 55 and a maternal uncle had prostate cancer. Ashkenazi: yes  BRCA testing: negative s/p genetic counselor 2017 [de-identified] : Patient seen and examined for follow up. due for C3 TC. She had complications with port and is now receiving peripherally.  Appt for port removal 8/29/2024.No flushing with cycle 2 Taxotere with increased benadryl. feeling well other than a little more fatigue  DEXA 6/18/2024 [facility]- [facility]-2/2024 Mammo-5/2024

## 2024-08-23 NOTE — PHYSICAL EXAM
[Fully active, able to carry on all pre-disease performance without restriction] : Status 0 - Fully active, able to carry on all pre-disease performance without restriction [Normal] : affect appropriate [de-identified] : deferred today

## 2024-08-28 ENCOUNTER — RESULT REVIEW (OUTPATIENT)
Age: 74
End: 2024-08-28

## 2024-09-12 ENCOUNTER — RESULT REVIEW (OUTPATIENT)
Age: 74
End: 2024-09-12

## 2024-09-12 ENCOUNTER — APPOINTMENT (OUTPATIENT)
Dept: HEMATOLOGY ONCOLOGY | Facility: CLINIC | Age: 74
End: 2024-09-12
Payer: MEDICARE

## 2024-09-12 ENCOUNTER — LABORATORY RESULT (OUTPATIENT)
Age: 74
End: 2024-09-12

## 2024-09-12 VITALS
TEMPERATURE: 98.6 F | DIASTOLIC BLOOD PRESSURE: 87 MMHG | HEIGHT: 67 IN | OXYGEN SATURATION: 95 % | HEART RATE: 60 BPM | SYSTOLIC BLOOD PRESSURE: 139 MMHG | RESPIRATION RATE: 16 BRPM | BODY MASS INDEX: 45.99 KG/M2 | WEIGHT: 293 LBS

## 2024-09-12 DIAGNOSIS — C50.812 MALIGNANT NEOPLASM OF OVERLAPPING SITES OF LEFT FEMALE BREAST: ICD-10-CM

## 2024-09-12 DIAGNOSIS — Z17.0 MALIGNANT NEOPLASM OF OVERLAPPING SITES OF LEFT FEMALE BREAST: ICD-10-CM

## 2024-09-12 PROCEDURE — 99214 OFFICE O/P EST MOD 30 MIN: CPT

## 2024-09-12 NOTE — PHYSICAL EXAM
[Fully active, able to carry on all pre-disease performance without restriction] : Status 0 - Fully active, able to carry on all pre-disease performance without restriction [Normal] : affect appropriate [de-identified] : deferred today

## 2024-09-12 NOTE — ASSESSMENT
[With Patient/Caregiver] : With Patient/Caregiver [Designated Health Care Proxy] : Designated Health Care Proxy [Name: ___] : Name: [unfilled] [Relationship: ___] : Relationship: [unfilled] [FreeTextEntry1] : #Breast Cancer  - L moderately differentiated invasive ductal carcinoma that was ER/[place] positive HER2 negative with a Ki-67 of 20% - s/p lumpectomy - Oncotype 26 - >15% benefit for the addition of chemo - I would recommend chemotherapy with Taxotere 75 mg/m2 and Cytoxan 600 mg/m2 followed by onpro x 4 cycles every 3 weeks.  - We have reviewed the side effects of this regimen to include but are not limited to cytopenias with increased risk of infection, sepsis and death, N/V, fatigue, swelling, hair loss, <1 % chance of developing leukemia in the future.  - Will give steroids day before, day of and day after treatment to aid with swelling - Will get port for access. Lidocaine - prilocaine cream ordered - Zofran orered PRN Nausea for home - After chemo and RT I would recommend an aromatase inhibitor, Anastrozole, 1mg PO q day for a minimum of 5 years given that her tumor is ER/MA+ and she is post menopausal. We have reviewed the side effects of Anastrozole to include but are not limited to hot flashes, mood swings, arthralgias, bone pain, thinning of the bones including osteopenia/osteoporosis. She will take this with Ca++ 1200 mg PO q day and Vit D 800 IU daily to protect her bone health.  - DEXA shows normal bone density - Port ordered - Emla cream ordered - Zofran ordered - Dexamethasone ordered. - She does not wish to consider cold capping at this time. - 7/11/24 - vs and labs reviewed. labs drawn in office today. wbc 15 (on steroids - monitor), hgb 15.2, plt 367. cr wnl, liver enzymes wnl., electrolytes wnl. issue with port and tubbing can not be used. proceed with first cycle of TC peripherally. - 8/1/24 vs and labs reviewed; WBC 20.54, hgb 14, [id number] (on steroids and s/p Neulasta on pro) no fevers. ANC wnl. CMP wnl. Proceed with C2 TC today.  - 8/22/24 - vs and labs reviewed. labs drawn in office today. wbc 16.33, hgb 12.8, plts 535 - likely due to inflammation monitor. kidney function, liver function and electrolytes wnl. ALKP 158 - monitor check GGT at [place]. CRP 17.6. ESR 69. proceed C3 TC today - 9/12/24 vs and labs reviewed; Okay to proceed with C4 TC today. She will meet with Rad Onc to discuss given high oncotype.Patient does not want RT. Following discussion with discuss starting AI. She will return in 4-6 weeks to review   #Nausea  - No emesis relived with Zofran. Continue Zofran prn   #Hypomagnesemia  - Now supplementing orally. Wnl today   #Hypophosphatemia  - 2.4 following phospha. WIll increase in diet   #Port  - Patient had port placed however with C1 there was difficulty with accessing. It was evaluated by Dr. Duque, cath was noted to no longer be attached. Thus C1 was giving peripherally. Patient does not want to go back to IR thus will continue to give peripherally. Assessed for arm swelling or neck swelling related to port, none on PE today although difficult due to body habitus. Continue to monitor. No clot s/s.  - 9/12/24 s/p port removal   RTC in 4-6 weeks with cbc with diff, cmp, ESR/CRP, LDH, Mag, phos,  Case and management discussed with DR. 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

## 2024-09-12 NOTE — PHYSICAL EXAM
[Fully active, able to carry on all pre-disease performance without restriction] : Status 0 - Fully active, able to carry on all pre-disease performance without restriction [Normal] : affect appropriate [de-identified] : deferred today

## 2024-09-12 NOTE — HISTORY OF PRESENT ILLNESS
[de-identified] : Ms. Hansen is a 74-year-old postmenopausal woman with a family history of breast cancer and a personal history of a right breast cancer presents for recurrence.   Oncological History:   In 1996 patient had a right breast mastectomy with TRAM flap reconstruction for a breast cancer followed by 3 years of tamoxifen without any chemotherapy or radiation.  s/p screening mammogram showed left breast asymmetry that on diagnostic views and ultrasound was found to be a 5 mm irregular centrally located mass measuring 5 mm, BI-RADS 4.   Left ultrasound-guided core biopsy revealed a moderately differentiated invasive ductal carcinoma that was ER/AK positive HER2 negative with a Ki-67 of 20%.   Patient had a gene test then and again in 2005 both of which were negative.   5/24 left partial mastectomy for a moderately differentiated invasive ductal carcinoma, 6 mm, negative margins, ER/AK positive HER2 negative with a Ki-67 of 15%.  DEXA many years ago. On ca++ vit D. Was told she had normal bone density.   Breast Cancer Risk Factors: Menarche: 14 Date of LMP: late 40s Menopause: late 40s G 3P2 Age at first live birth: 35 Nursed: yes Hysterectomy: yes Oophorectomy: yes OCP: 10-12 y HRT: no Last pap/pelvic exam: Dr. Morse  Related family history Patient's mother had breast cancer at age 55 and a maternal uncle had prostate cancer. Ashkenazi: yes  BRCA testing: negative s/p genetic counselor 2017 [de-identified] : Patient seen and examined for follow up. due for C4 TC. She had complications with port and is now receiving peripherally. Port has been removed. States this past cycle was okay but felt more tired. No neuropathy. Nausea mild resolved with Zofran. No vomiting. no fevers or diarrhea   DEXA 6/18/2024 [facility]- [facility]-2/2024 Mammo-5/2024

## 2024-09-12 NOTE — HISTORY OF PRESENT ILLNESS
[de-identified] : Ms. Hansen is a 74-year-old postmenopausal woman with a family history of breast cancer and a personal history of a right breast cancer presents for recurrence.   Oncological History:   In 1996 patient had a right breast mastectomy with TRAM flap reconstruction for a breast cancer followed by 3 years of tamoxifen without any chemotherapy or radiation.  s/p screening mammogram showed left breast asymmetry that on diagnostic views and ultrasound was found to be a 5 mm irregular centrally located mass measuring 5 mm, BI-RADS 4.   Left ultrasound-guided core biopsy revealed a moderately differentiated invasive ductal carcinoma that was ER/[place] positive HER2 negative with a Ki-67 of 20%.   Patient had a gene test then and again in 2005 both of which were negative.   5/24 left partial mastectomy for a moderately differentiated invasive ductal carcinoma, 6 mm, negative margins, ER/[place] positive HER2 negative with a Ki-67 of 15%.  DEXA many years ago. On ca++ vit D. Was told she had normal bone density.   Breast Cancer Risk Factors: Menarche: 14 Date of LMP: late 40s Menopause: late 40s G 3P2 Age at first live birth: 35 Nursed: yes Hysterectomy: yes Oophorectomy: yes OCP: 10-12 y HRT: no Last pap/pelvic exam: Dr. Morse  Related family history Patient's mother had breast cancer at age 55 and a maternal uncle had prostate cancer. Ashkenazi: yes  BRCA testing: negative s/p genetic counselor 2017 [de-identified] : Patient seen and examined for follow up. due for C4 TC. She had complications with port and is now receiving peripherally. Port has been removed. States this past cycle was okay but felt more tired. No neuropathy. Nausea mild resolved with Zofran. No vomiting. no fevers or diarrhea   DEXA 6/18/2024 [facility]- [facility]-2/2024 Mammo-5/2024

## 2024-09-12 NOTE — ASSESSMENT
[With Patient/Caregiver] : With Patient/Caregiver [Designated Health Care Proxy] : Designated Health Care Proxy [Name: ___] : Name: [unfilled] [Relationship: ___] : Relationship: [unfilled] [FreeTextEntry1] : #Breast Cancer  - L moderately differentiated invasive ductal carcinoma that was ER/NE positive HER2 negative with a Ki-67 of 20% - s/p lumpectomy - Oncotype 26 - >15% benefit for the addition of chemo - I would recommend chemotherapy with Taxotere 75 mg/m2 and Cytoxan 600 mg/m2 followed by onpro x 4 cycles every 3 weeks.  - We have reviewed the side effects of this regimen to include but are not limited to cytopenias with increased risk of infection, sepsis and death, N/V, fatigue, swelling, hair loss, <1 % chance of developing leukemia in the future.  - Will give steroids day before, day of and day after treatment to aid with swelling - Will get port for access. Lidocaine - prilocaine cream ordered - Zofran orered PRN Nausea for home - After chemo and RT I would recommend an aromatase inhibitor, Anastrozole, 1mg PO q day for a minimum of 5 years given that her tumor is ER/NE+ and she is post menopausal. We have reviewed the side effects of Anastrozole to include but are not limited to hot flashes, mood swings, arthralgias, bone pain, thinning of the bones including osteopenia/osteoporosis. She will take this with Ca++ 1200 mg PO q day and Vit D 800 IU daily to protect her bone health.  - DEXA shows normal bone density - Port ordered - Emla cream ordered - Zofran ordered - Dexamethasone ordered. - She does not wish to consider cold capping at this time. - 7/11/24 - vs and labs reviewed. labs drawn in office today. wbc 15 (on steroids - monitor), hgb 15.2, plt 367. cr wnl, liver enzymes wnl., electrolytes wnl. issue with port and tubbing can not be used. proceed with first cycle of TC peripherally. - 8/1/24 vs and labs reviewed; WBC 20.54, hgb 14, rsp643 (on steroids and s/p Neulasta on pro) no fevers. ANC wnl. CMP wnl. Proceed with C2 TC today.  - 8/22/24 - vs and labs reviewed. labs drawn in office today. wbc 16.33, hgb 12.8, plts 535 - likely due to inflammation monitor. kidney function, liver function and electrolytes wnl. ALKP 158 - monitor check GGT at [place]. CRP 17.6. ESR 69. proceed C3 TC today - 9/12/24 vs and labs reviewed; Okay to proceed with C4 TC today. She will meet with Rad Onc to discuss given high oncotype.Patient does not want RT. Following discussion with discuss starting AI. She will return in 4-6 weeks to review   #Nausea  - No emesis relived with Zofran. Continue Zofran prn   #Hypomagnesemia  - Now supplementing orally. Wnl today   #Hypophosphatemia  - 2.4 following phospha. WIll increase in diet   #Port  - Patient had port placed however with C1 there was difficulty with accessing. It was evaluated by Dr. Duque, cath was noted to no longer be attached. Thus C1 was giving peripherally. Patient does not want to go back to IR thus will continue to give peripherally. Assessed for arm swelling or neck swelling related to port, none on PE today although difficult due to body habitus. Continue to monitor. No clot s/s.  - 9/12/24 s/p port removal   RTC in 4-6 weeks with cbc with diff, cmp, ESR/CRP, LDH, Mag, phos,  Case and management discussed with DR. 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

## 2024-09-18 ENCOUNTER — APPOINTMENT (OUTPATIENT)
Dept: BREAST CENTER | Facility: CLINIC | Age: 74
End: 2024-09-18
Payer: MEDICARE

## 2024-09-18 VITALS
HEIGHT: 67 IN | BODY MASS INDEX: 45.99 KG/M2 | HEART RATE: 111 BPM | WEIGHT: 293 LBS | SYSTOLIC BLOOD PRESSURE: 112 MMHG | OXYGEN SATURATION: 95 % | DIASTOLIC BLOOD PRESSURE: 73 MMHG

## 2024-09-18 DIAGNOSIS — Z90.12 ACQUIRED ABSENCE OF LEFT BREAST AND NIPPLE: ICD-10-CM

## 2024-09-18 DIAGNOSIS — Z85.3 PERSONAL HISTORY OF MALIGNANT NEOPLASM OF BREAST: ICD-10-CM

## 2024-09-18 DIAGNOSIS — Z90.11 ACQUIRED ABSENCE OF RIGHT BREAST AND NIPPLE: ICD-10-CM

## 2024-09-18 PROCEDURE — 99213 OFFICE O/P EST LOW 20 MIN: CPT

## 2024-09-18 NOTE — PHYSICAL EXAM
[No Supraclavicular Adenopathy] : no supraclavicular adenopathy [No Cervical Adenopathy] : no cervical adenopathy [Clear to Auscultation Bilat] : clear to auscultation bilaterally [Examined in the supine and seated position] : examined in the supine and seated position [Asymmetrical] : asymmetrical [No dominant masses] : no dominant masses in right breast  [No dominant masses] : no dominant masses left breast [No Nipple Retraction] : no left nipple retraction [No Nipple Discharge] : no left nipple discharge [No Axillary Lymphadenopathy] : no left axillary lymphadenopathy [No Rashes] : no rashes [de-identified] : Status post right mastectomy with TRAM flap reconstruction

## 2024-09-18 NOTE — REASON FOR VISIT
[Follow-Up: _____] : a [unfilled] follow-up visit [FreeTextEntry1] : Status post left partial mastectomy and right mastectomy

## 2024-09-18 NOTE — HISTORY OF PRESENT ILLNESS
[FreeTextEntry1] : 1996 right mastectomy with TRAM flap reconstruction  Tamoxifen for 3 years   5/24 left partial mastectomy moderately differentiated invasive ductal carcinoma, 6 mm, negative margins, ER/MI positive HER2 negative with a Ki-67 of 15%. T1b N0 M0, stage Ia; Oncotype 26 Chemotherapy, hormonal therapy  Patient denies any breast masses or bone pain.  She just completed her chemotherapy last week.  Patient refuses radiation treatment and is about to start her hormonal therapy after she sees her medical oncologist. Gene tested negative  74-year-old postmenopausal woman with a family history of breast cancer and a personal history of a right breast cancer presents after screening mammogram showed left breast asymmetry that on diagnostic views and ultrasound was found to be a 5 mm irregular centrally located mass measuring 5 mm, BI-RADS 4.  Left ultrasound-guided core biopsy revealed a moderately differentiated invasive ductal carcinoma that was ER/MI positive HER2 negative with a Ki-67 of 20%.  Patient denies any breast masses or bone pain.  In 1996 patient had a right breast mastectomy with TRAM flap reconstruction for a breast cancer followed by 3 years of tamoxifen without any chemotherapy or radiation.  Patient had a gene test then and again in 2005 both of which were negative.  Patient's mother had breast cancer at age 55 and a maternal uncle had prostate cancer.

## 2024-10-22 ENCOUNTER — APPOINTMENT (OUTPATIENT)
Dept: HEMATOLOGY ONCOLOGY | Facility: CLINIC | Age: 74
End: 2024-10-22

## 2024-10-22 ENCOUNTER — RESULT REVIEW (OUTPATIENT)
Age: 74
End: 2024-10-22

## 2024-10-22 VITALS
BODY MASS INDEX: 45.69 KG/M2 | TEMPERATURE: 98.8 F | HEIGHT: 67 IN | OXYGEN SATURATION: 95 % | WEIGHT: 291.1 LBS | DIASTOLIC BLOOD PRESSURE: 84 MMHG | SYSTOLIC BLOOD PRESSURE: 129 MMHG | RESPIRATION RATE: 16 BRPM | HEART RATE: 92 BPM

## 2024-10-22 DIAGNOSIS — C50.812 MALIGNANT NEOPLASM OF OVERLAPPING SITES OF LEFT FEMALE BREAST: ICD-10-CM

## 2024-10-22 DIAGNOSIS — Z90.79 ACQUIRED ABSENCE OF BOTH CERVIX AND UTERUS: ICD-10-CM

## 2024-10-22 DIAGNOSIS — Z90.722 ACQUIRED ABSENCE OF BOTH CERVIX AND UTERUS: ICD-10-CM

## 2024-10-22 DIAGNOSIS — Z90.710 ACQUIRED ABSENCE OF BOTH CERVIX AND UTERUS: ICD-10-CM

## 2024-10-22 DIAGNOSIS — Z90.12 ACQUIRED ABSENCE OF LEFT BREAST AND NIPPLE: ICD-10-CM

## 2024-10-22 DIAGNOSIS — Z17.0 MALIGNANT NEOPLASM OF OVERLAPPING SITES OF LEFT FEMALE BREAST: ICD-10-CM

## 2024-10-22 DIAGNOSIS — Z90.11 ACQUIRED ABSENCE OF RIGHT BREAST AND NIPPLE: ICD-10-CM

## 2024-10-22 PROCEDURE — 99214 OFFICE O/P EST MOD 30 MIN: CPT

## 2024-10-22 RX ORDER — ANASTROZOLE TABLETS 1 MG/1
1 TABLET ORAL DAILY
Qty: 90 | Refills: 3 | Status: ACTIVE | COMMUNITY
Start: 2024-10-22 | End: 1900-01-01

## 2025-03-10 ENCOUNTER — APPOINTMENT (OUTPATIENT)
Dept: HEMATOLOGY ONCOLOGY | Facility: CLINIC | Age: 75
End: 2025-03-10

## 2025-03-18 ENCOUNTER — RESULT REVIEW (OUTPATIENT)
Age: 75
End: 2025-03-18

## 2025-03-18 ENCOUNTER — APPOINTMENT (OUTPATIENT)
Dept: HEMATOLOGY ONCOLOGY | Facility: CLINIC | Age: 75
End: 2025-03-18

## 2025-03-18 VITALS
TEMPERATURE: 97.7 F | HEIGHT: 67 IN | HEART RATE: 78 BPM | WEIGHT: 282 LBS | DIASTOLIC BLOOD PRESSURE: 79 MMHG | BODY MASS INDEX: 44.26 KG/M2 | OXYGEN SATURATION: 95 % | SYSTOLIC BLOOD PRESSURE: 133 MMHG | RESPIRATION RATE: 16 BRPM

## 2025-03-18 DIAGNOSIS — Z90.722 ACQUIRED ABSENCE OF BOTH CERVIX AND UTERUS: ICD-10-CM

## 2025-03-18 DIAGNOSIS — Z17.0 MALIGNANT NEOPLASM OF OVERLAPPING SITES OF LEFT FEMALE BREAST: ICD-10-CM

## 2025-03-18 DIAGNOSIS — Z90.710 ACQUIRED ABSENCE OF BOTH CERVIX AND UTERUS: ICD-10-CM

## 2025-03-18 DIAGNOSIS — C50.812 MALIGNANT NEOPLASM OF OVERLAPPING SITES OF LEFT FEMALE BREAST: ICD-10-CM

## 2025-03-18 DIAGNOSIS — Z90.79 ACQUIRED ABSENCE OF BOTH CERVIX AND UTERUS: ICD-10-CM

## 2025-03-18 PROCEDURE — G2211 COMPLEX E/M VISIT ADD ON: CPT

## 2025-03-18 PROCEDURE — 99214 OFFICE O/P EST MOD 30 MIN: CPT

## 2025-03-27 ENCOUNTER — RESULT REVIEW (OUTPATIENT)
Age: 75
End: 2025-03-27

## 2025-03-27 ENCOUNTER — APPOINTMENT (OUTPATIENT)
Dept: BREAST CENTER | Facility: CLINIC | Age: 75
End: 2025-03-27
Payer: MEDICARE

## 2025-03-27 VITALS
SYSTOLIC BLOOD PRESSURE: 123 MMHG | HEIGHT: 67 IN | DIASTOLIC BLOOD PRESSURE: 87 MMHG | OXYGEN SATURATION: 94 % | BODY MASS INDEX: 45.52 KG/M2 | HEART RATE: 91 BPM | WEIGHT: 290 LBS

## 2025-03-27 DIAGNOSIS — R92.8 OTHER ABNORMAL AND INCONCLUSIVE FINDINGS ON DIAGNOSTIC IMAGING OF BREAST: ICD-10-CM

## 2025-03-27 DIAGNOSIS — Z90.11 ACQUIRED ABSENCE OF RIGHT BREAST AND NIPPLE: ICD-10-CM

## 2025-03-27 DIAGNOSIS — Z90.12 ACQUIRED ABSENCE OF LEFT BREAST AND NIPPLE: ICD-10-CM

## 2025-03-27 DIAGNOSIS — Z85.3 PERSONAL HISTORY OF MALIGNANT NEOPLASM OF BREAST: ICD-10-CM

## 2025-03-27 PROCEDURE — 99213 OFFICE O/P EST LOW 20 MIN: CPT

## 2025-04-09 ENCOUNTER — NON-APPOINTMENT (OUTPATIENT)
Age: 75
End: 2025-04-09

## 2025-07-22 ENCOUNTER — APPOINTMENT (OUTPATIENT)
Dept: HEMATOLOGY ONCOLOGY | Facility: CLINIC | Age: 75
End: 2025-07-22
Payer: MEDICARE

## 2025-07-22 ENCOUNTER — RESULT REVIEW (OUTPATIENT)
Age: 75
End: 2025-07-22

## 2025-07-22 VITALS
RESPIRATION RATE: 16 BRPM | HEIGHT: 67 IN | DIASTOLIC BLOOD PRESSURE: 87 MMHG | SYSTOLIC BLOOD PRESSURE: 123 MMHG | BODY MASS INDEX: 45.52 KG/M2 | WEIGHT: 290 LBS | HEART RATE: 91 BPM | TEMPERATURE: 97.7 F | OXYGEN SATURATION: 94 %

## 2025-07-22 DIAGNOSIS — Z90.79 ACQUIRED ABSENCE OF BOTH CERVIX AND UTERUS: ICD-10-CM

## 2025-07-22 DIAGNOSIS — Z90.710 ACQUIRED ABSENCE OF BOTH CERVIX AND UTERUS: ICD-10-CM

## 2025-07-22 DIAGNOSIS — Z90.722 ACQUIRED ABSENCE OF BOTH CERVIX AND UTERUS: ICD-10-CM

## 2025-07-22 DIAGNOSIS — E66.9 OBESITY, UNSPECIFIED: ICD-10-CM

## 2025-07-22 DIAGNOSIS — Z17.0 MALIGNANT NEOPLASM OF OVERLAPPING SITES OF LEFT FEMALE BREAST: ICD-10-CM

## 2025-07-22 DIAGNOSIS — Z90.11 ACQUIRED ABSENCE OF RIGHT BREAST AND NIPPLE: ICD-10-CM

## 2025-07-22 DIAGNOSIS — Z85.3 PERSONAL HISTORY OF MALIGNANT NEOPLASM OF BREAST: ICD-10-CM

## 2025-07-22 DIAGNOSIS — C50.812 MALIGNANT NEOPLASM OF OVERLAPPING SITES OF LEFT FEMALE BREAST: ICD-10-CM

## 2025-07-22 DIAGNOSIS — R53.83 OTHER FATIGUE: ICD-10-CM

## 2025-07-22 DIAGNOSIS — Z90.12 ACQUIRED ABSENCE OF LEFT BREAST AND NIPPLE: ICD-10-CM

## 2025-07-22 PROCEDURE — 99215 OFFICE O/P EST HI 40 MIN: CPT

## 2025-07-22 PROCEDURE — G2211 COMPLEX E/M VISIT ADD ON: CPT

## 2025-07-22 RX ORDER — TIRZEPATIDE 2.5 MG/.5ML
2.5 INJECTION, SOLUTION SUBCUTANEOUS
Refills: 0 | Status: ACTIVE | COMMUNITY